# Patient Record
Sex: MALE | Race: WHITE | NOT HISPANIC OR LATINO | Employment: UNEMPLOYED | URBAN - METROPOLITAN AREA
[De-identification: names, ages, dates, MRNs, and addresses within clinical notes are randomized per-mention and may not be internally consistent; named-entity substitution may affect disease eponyms.]

---

## 2017-02-14 ENCOUNTER — TRANSCRIBE ORDERS (OUTPATIENT)
Dept: ADMINISTRATIVE | Age: 62
End: 2017-02-14

## 2017-02-14 ENCOUNTER — APPOINTMENT (OUTPATIENT)
Dept: LAB | Age: 62
End: 2017-02-14
Attending: PREVENTIVE MEDICINE

## 2017-02-14 DIAGNOSIS — Z00.00 ROUTINE GENERAL MEDICAL EXAMINATION AT A HEALTH CARE FACILITY: Primary | ICD-10-CM

## 2017-02-14 DIAGNOSIS — Z00.00 ROUTINE GENERAL MEDICAL EXAMINATION AT A HEALTH CARE FACILITY: ICD-10-CM

## 2017-02-14 LAB — RUBV IGG SERPL IA-ACNC: >175 IU/ML

## 2017-02-14 PROCEDURE — 86765 RUBEOLA ANTIBODY: CPT

## 2017-02-14 PROCEDURE — 86735 MUMPS ANTIBODY: CPT

## 2017-02-14 PROCEDURE — 86787 VARICELLA-ZOSTER ANTIBODY: CPT

## 2017-02-14 PROCEDURE — 36415 COLL VENOUS BLD VENIPUNCTURE: CPT

## 2017-02-14 PROCEDURE — 86762 RUBELLA ANTIBODY: CPT

## 2017-02-16 LAB
MEV IGG SER QL: NORMAL
MUV IGG SER QL: NORMAL
VZV IGG SER IA-ACNC: NORMAL

## 2018-02-05 ENCOUNTER — TELEPHONE (OUTPATIENT)
Dept: UROLOGY | Facility: AMBULATORY SURGERY CENTER | Age: 63
End: 2018-02-05

## 2018-02-16 RX ORDER — KETOCONAZOLE 20 MG/ML
SHAMPOO TOPICAL
Refills: 3 | COMMUNITY
Start: 2018-01-26

## 2018-02-20 DIAGNOSIS — N20.0 KIDNEY STONE: ICD-10-CM

## 2018-02-20 DIAGNOSIS — N40.0 BPH WITHOUT OBSTRUCTION/LOWER URINARY TRACT SYMPTOMS: Primary | ICD-10-CM

## 2018-02-21 ENCOUNTER — OFFICE VISIT (OUTPATIENT)
Dept: UROLOGY | Facility: MEDICAL CENTER | Age: 63
End: 2018-02-21
Payer: COMMERCIAL

## 2018-02-21 ENCOUNTER — TRANSCRIBE ORDERS (OUTPATIENT)
Dept: RADIOLOGY | Facility: HOSPITAL | Age: 63
End: 2018-02-21

## 2018-02-21 ENCOUNTER — HOSPITAL ENCOUNTER (OUTPATIENT)
Dept: RADIOLOGY | Facility: HOSPITAL | Age: 63
Discharge: HOME | End: 2018-02-21
Attending: UROLOGY
Payer: COMMERCIAL

## 2018-02-21 VITALS
BODY MASS INDEX: 24 KG/M2 | DIASTOLIC BLOOD PRESSURE: 70 MMHG | HEIGHT: 75 IN | SYSTOLIC BLOOD PRESSURE: 138 MMHG | WEIGHT: 193 LBS

## 2018-02-21 DIAGNOSIS — N20.0 CALCULUS OF KIDNEY: ICD-10-CM

## 2018-02-21 DIAGNOSIS — R35.0 FREQUENCY OF MICTURITION: Primary | ICD-10-CM

## 2018-02-21 DIAGNOSIS — N20.0 KIDNEY STONE: ICD-10-CM

## 2018-02-21 DIAGNOSIS — N40.1 BENIGN PROSTATIC HYPERPLASIA WITH URINARY OBSTRUCTION: ICD-10-CM

## 2018-02-21 DIAGNOSIS — N13.8 BENIGN PROSTATIC HYPERPLASIA WITH URINARY OBSTRUCTION: ICD-10-CM

## 2018-02-21 DIAGNOSIS — N20.0 KIDNEY STONE: Primary | ICD-10-CM

## 2018-02-21 LAB
SL AMB  POCT GLUCOSE, UA: NORMAL
SL AMB LEUKOCYTE ESTERASE,UA: NORMAL
SL AMB POCT BILIRUBIN,UA: NORMAL
SL AMB POCT BLOOD,UA: NORMAL
SL AMB POCT CLARITY,UA: CLEAR
SL AMB POCT COLOR,UA: YELLOW
SL AMB POCT KETONES,UA: NORMAL
SL AMB POCT NITRITE,UA: NORMAL
SL AMB POCT PH,UA: 7
SL AMB POCT SPECIFIC GRAVITY,UA: 1.01
SL AMB POCT URINE PROTEIN: NORMAL
SL AMB POCT UROBILINOGEN: 0.2

## 2018-02-21 PROCEDURE — 74018 RADEX ABDOMEN 1 VIEW: CPT

## 2018-02-21 PROCEDURE — 81003 URINALYSIS AUTO W/O SCOPE: CPT | Performed by: UROLOGY

## 2018-02-21 PROCEDURE — 99214 OFFICE O/P EST MOD 30 MIN: CPT | Performed by: UROLOGY

## 2018-02-21 RX ORDER — TAMSULOSIN HYDROCHLORIDE 0.4 MG/1
0.4 CAPSULE ORAL
COMMUNITY
Start: 2018-01-29 | End: 2019-01-29

## 2018-02-21 RX ORDER — BUTALBITAL, ACETAMINOPHEN AND CAFFEINE 50; 325; 40 MG/1; MG/1; MG/1
TABLET ORAL
COMMUNITY
Start: 2018-01-29

## 2018-02-21 RX ORDER — ZINC GLUCONATE 50 MG
50 TABLET ORAL
COMMUNITY

## 2018-02-21 RX ORDER — LORAZEPAM 0.5 MG/1
TABLET ORAL
COMMUNITY
Start: 2017-10-23

## 2018-02-21 RX ORDER — ASCORBATE CALCIUM 500 MG
500 TABLET ORAL
COMMUNITY

## 2018-02-21 RX ORDER — BILBERRY FRUIT 1000 MG
CAPSULE ORAL
COMMUNITY

## 2018-02-21 RX ORDER — DIPHENOXYLATE HYDROCHLORIDE AND ATROPINE SULFATE 2.5; .025 MG/1; MG/1
1 TABLET ORAL
COMMUNITY
Start: 2013-12-04

## 2018-02-21 RX ORDER — FERROUS SULFATE 325(65) MG
325 TABLET ORAL
COMMUNITY
Start: 2017-08-02 | End: 2018-08-02

## 2018-02-21 NOTE — PROGRESS NOTES
Assessment/Plan:    Frequency of micturition  Resolved    Benign prostatic hyperplasia with urinary obstruction  Patient is generally satisfied with his voiding pattern  His AUA symptom score is 17  PSA in July 2017 was 2 2  We discussed treatment options  He will begin saw palmetto  He has a prescription for TAMSULOSIN and he will try this medication if he is not happy with the results of Sol palmetto  Side effects of TAMSULOSIN were discussed  Calculus of kidney  KUB was reviewed today  Multiple tiny stones in left kidney are seen  He is asymptomatic  He will continue on observation and will return in 1 year  Diagnoses and all orders for this visit:    Frequency of micturition  -     POCT urine dip auto non-scope    Benign prostatic hyperplasia with urinary obstruction    Calculus of kidney    Other orders  -     Calcium Ascorbate 500 MG TABS; Take 500 mg by mouth  -     aspirin 81 MG tablet; Take 81 mg by mouth  -     butalbital-acetaminophen-caffeine (FIORICET,ESGIC) -40 mg per tablet; 1-2 tablets up to tid prn headaches  -     cholecalciferol (VITAMIN D3) 20173 units capsule; Take 1,000 Units by mouth  -     ferrous sulfate 325 (65 Fe) mg tablet; Take 325 mg by mouth  -     LORazepam (ATIVAN) 0 5 mg tablet; TAKE 1 TABLET BY MOUTH TWICE A DAY AS NEEDED  -     multivitamin (THERAGRAN) TABS; Take 1 tablet by mouth  -     tamsulosin (FLOMAX) 0 4 mg; Take 0 4 mg by mouth  -     zinc gluconate 50 mg tablet; Take 50 mg by mouth  -     Saw [place] 1000 MG CAPS; Take by mouth          Subjective:      Patient ID: Juan Pablo English is a 57 y o  male  15-year-old male followed for the lower tract symptoms secondary to BPH and a history of kidney stones  He has not passed any stones recently  He feels well  He generally voids adequately although he has had episodes of frequency and nocturia  He never started the TAMSULOSIN prescribed last year  He is interested in trying saw palmetto    He denies gross hematuria, dysuria or symptoms of infection  He has no flank pain  The following portions of the patient's history were reviewed and updated as appropriate: allergies, current medications, past family history, past medical history, past social history, past surgical history and problem list     Review of Systems   Constitutional: Negative for chills, diaphoresis, fatigue and fever  HENT: Negative  Eyes: Negative  Respiratory: Negative  Cardiovascular: Negative  Endocrine: Negative  Musculoskeletal: Negative  Skin: Negative  Allergic/Immunologic: Negative  Neurological: Negative  Hematological: Negative  Psychiatric/Behavioral: Negative  Objective:      /70 (BP Location: Left arm, Patient Position: Sitting)   Ht 6' 2 5" (1 892 m)   Wt 87 5 kg (193 lb)   BMI 24 45 kg/m²          Physical Exam   Constitutional: He is oriented to person, place, and time  He appears well-developed and well-nourished  HENT:   Head: Normocephalic and atraumatic  Eyes: Conjunctivae are normal    Neck: Neck supple  Cardiovascular: Normal rate  Pulmonary/Chest: Effort normal    Abdominal: Soft  Bowel sounds are normal  He exhibits no distension and no mass  There is no tenderness  There is no rebound, no guarding and no CVA tenderness  Hernia confirmed negative in the right inguinal area and confirmed negative in the left inguinal area  No CVA tenderness     Left inguinal hernia   Genitourinary: Rectum normal, testes normal and penis normal  Prostate is enlarged  Prostate is not tender  Right testis shows no mass  Left testis shows no mass  No phimosis or hypospadias  Genitourinary Comments:  Prostate moderately enlarged, nontender and palpably benign   Musculoskeletal: He exhibits no edema  Neurological: He is alert and oriented to person, place, and time  Skin: Skin is warm and dry  Psychiatric: He has a normal mood and affect   His behavior is normal  Judgment and thought content normal

## 2018-02-21 NOTE — LETTER
February 21, 2018     [phone] [phone]    Patient: Jose Posada   YOB: 1955   Date of Visit: 2/21/2018       Dear Dr Noreen Strickland: Thank you for referring Jose Posada to me for evaluation  Below are my notes for this consultation  If you have questions, please do not hesitate to call me  I look forward to following your patient along with you  Sincerely,        Ren Acuna MD        CC: No Recipients  Ren Acuna MD  2/21/2018  4:34 PM  Signed  Assessment/Plan:    Frequency of micturition  Resolved    Benign prostatic hyperplasia with urinary obstruction  Patient is generally satisfied with his voiding pattern  His AUA symptom score is 17  PSA in July 2017 was 2 2  We discussed treatment options  He will begin saw palmetto  He has a prescription for TAMSULOSIN and he will try this medication if he is not happy with the results of Sol palmetto  Side effects of TAMSULOSIN were discussed  Calculus of kidney  KUB was reviewed today  Multiple tiny stones in left kidney are seen  He is asymptomatic  He will continue on observation and will return in 1 year  Diagnoses and all orders for this visit:    Frequency of micturition  -     POCT urine dip auto non-scope    Benign prostatic hyperplasia with urinary obstruction    Calculus of kidney    Other orders  -     Calcium Ascorbate 500 MG TABS; Take 500 mg by mouth  -     aspirin 81 MG tablet; Take 81 mg by mouth  -     butalbital-acetaminophen-caffeine (FIORICET,ESGIC) -40 mg per tablet; 1-2 tablets up to tid prn headaches  -     cholecalciferol (VITAMIN D3) 21333 units capsule; Take 1,000 Units by mouth  -     ferrous sulfate 325 (65 Fe) mg tablet;  Take 325 mg by mouth  -     LORazepam (ATIVAN) 0 5 mg tablet; TAKE 1 TABLET BY MOUTH TWICE A DAY AS NEEDED  -     multivitamin (THERAGRAN) TABS; Take 1 tablet by mouth  -     tamsulosin (FLOMAX) 0 4 mg; Take 0 4 mg by mouth  -     zinc gluconate 50 mg tablet; Take 50 mg by mouth  -     Saw [place] 1000 MG CAPS; Take by mouth          Subjective:      Patient ID: Nomi Mason is a 57 y o  male  42-year-old male followed for the lower tract symptoms secondary to BPH and a history of kidney stones  He has not passed any stones recently  He feels well  He generally voids adequately although he has had episodes of frequency and nocturia  He never started the TAMSULOSIN prescribed last year  He is interested in trying saw palmetto  He denies gross hematuria, dysuria or symptoms of infection  He has no flank pain  The following portions of the patient's history were reviewed and updated as appropriate: allergies, current medications, past family history, past medical history, past social history, past surgical history and problem list     Review of Systems   Constitutional: Negative for chills, diaphoresis, fatigue and fever  HENT: Negative  Eyes: Negative  Respiratory: Negative  Cardiovascular: Negative  Endocrine: Negative  Musculoskeletal: Negative  Skin: Negative  Allergic/Immunologic: Negative  Neurological: Negative  Hematological: Negative  Psychiatric/Behavioral: Negative  Objective:      /70 (BP Location: Left arm, Patient Position: Sitting)   Ht 6' 2 5" (1 892 m)   Wt 87 5 kg (193 lb)   BMI 24 45 kg/m²           Physical Exam   Constitutional: He is oriented to person, place, and time  He appears well-developed and well-nourished  HENT:   Head: Normocephalic and atraumatic  Eyes: Conjunctivae are normal    Neck: Neck supple  Cardiovascular: Normal rate  Pulmonary/Chest: Effort normal    Abdominal: Soft  Bowel sounds are normal  He exhibits no distension and no mass  There is no tenderness  There is no rebound, no guarding and no CVA tenderness   Hernia confirmed negative in the right inguinal area and confirmed negative in the left inguinal area  No CVA tenderness     Left inguinal hernia   Genitourinary: Rectum normal, testes normal and penis normal  Prostate is enlarged  Prostate is not tender  Right testis shows no mass  Left testis shows no mass  No phimosis or hypospadias  Genitourinary Comments:  Prostate moderately enlarged, nontender and palpably benign   Musculoskeletal: He exhibits no edema  Neurological: He is alert and oriented to person, place, and time  Skin: Skin is warm and dry  Psychiatric: He has a normal mood and affect   His behavior is normal  Judgment and thought content normal

## 2018-02-21 NOTE — PROGRESS NOTES
IPSS Questionnaire (AUA-7): Over the past month    1)  How often have you had a sensation of not emptying your bladder completely after you finish urinating? 3 - About half the time   2)  How often have you had to urinate again less than two hours after you finished urinating? 3 - About half the time   3)  How often have you found you stopped and started again several times when you urinated? 3 - About half the time   4) How difficult have you found it to postpone urination? 3 - About half the time   5) How often have you had a weak urinary stream?  3 - About half the time   6) How often have you had to push or strain to begin urination? 1 - Less than 1 time in 5   7) How many times did you most typically get up to urinate from the time you went to bed until the time you got up in the morning?   1 - 1 time   Total Score:  17

## 2018-02-21 NOTE — PATIENT INSTRUCTIONS
Benign Prostatic Hypertrophy   WHAT YOU NEED TO KNOW:   Benign prostatic hypertrophy (BPH) is a condition that causes your prostate gland to grow larger than normal  The prostate gland is the male sex gland that produces a fluid that is part of semen  It is about the size of a walnut and it is located under the bladder  As the prostate grows, it can squeeze the urethra  This can block urine flow and cause urinary problems  DISCHARGE INSTRUCTIONS:   Medicines:   · Alpha blockers: This medicine relaxes the muscles in your prostate and bladder  It may help you urinate more easily  · 5 alpha reductase inhibitors: These medicines block the production of a hormone that causes the prostate to get larger  It may help slow the growth of the prostate or shrink the prostate  · Take your medicine as directed  Contact your healthcare provider if you think your medicine is not helping or if you have side effects  Tell him or her if you are allergic to any medicine  Keep a list of the medicines, vitamins, and herbs you take  Include the amounts, and when and why you take them  Bring the list or the pill bottles to follow-up visits  Carry your medicine list with you in case of an emergency  Follow up with your healthcare provider as directed:  Write down your questions so you remember to ask them during your visits  Manage BPH:   · Do not let your bladder get too full before you empty it  Urinate when you feel the urge  · Limit alcohol  Do not drink large amounts of any liquid at one time  · Decrease the amount of salt you eat  Examples of salty foods are chips, cured meats, and canned soups  Do not use table salt  · Healthcare providers may tell you not to eat spicy foods such as chilli peppers  This may help you find out if spicy food makes your BPH symptoms worse  · You may have sex if you feel well  Contact your healthcare provider if:   · There is a large amount of blood in your urine  · Your signs and symptoms get worse  · You have a fever  · You have questions or concerns about your condition or care  Seek care immediately if:   · You are unable to urinate  · Your bladder feels very full and painful  © 2017 [address] [employer] Information is for End User's use only and may not be sold, redistributed or otherwise used for commercial purposes  All illustrations and images included in CareNotes® are the copyrighted property of [employer]  or [address]  The above information is an  only  It is not intended as medical advice for individual conditions or treatments  Talk to your doctor, nurse or pharmacist before following any medical regimen to see if it is safe and effective for you

## 2018-02-21 NOTE — ASSESSMENT & PLAN NOTE
KUB was reviewed today  Multiple tiny stones in left kidney are seen  He is asymptomatic  He will continue on observation and will return in 1 year

## 2018-02-21 NOTE — ASSESSMENT & PLAN NOTE
Patient is generally satisfied with his voiding pattern  His AUA symptom score is 17  PSA in July 2017 was 2 2  We discussed treatment options  He will begin saw palmetto  He has a prescription for TAMSULOSIN and he will try this medication if he is not happy with the results of Sol palmetto  Side effects of TAMSULOSIN were discussed

## 2019-03-05 ENCOUNTER — TELEPHONE (OUTPATIENT)
Dept: UROLOGY | Facility: MEDICAL CENTER | Age: 64
End: 2019-03-05

## 2019-03-21 RX ORDER — ROSUVASTATIN CALCIUM 5 MG/1
5 TABLET, COATED ORAL DAILY
Refills: 6 | COMMUNITY
Start: 2019-01-09

## 2020-06-29 ENCOUNTER — TRANSCRIBE ORDERS (OUTPATIENT)
Dept: ADMINISTRATIVE | Facility: HOSPITAL | Age: 65
End: 2020-06-29

## 2020-06-29 DIAGNOSIS — R22.33 LOCALIZED SWELLING, MASS AND LUMP, UPPER LIMB, BILATERAL: Primary | ICD-10-CM

## 2020-07-01 ENCOUNTER — HOSPITAL ENCOUNTER (OUTPATIENT)
Dept: RADIOLOGY | Facility: HOSPITAL | Age: 65
Discharge: HOME | End: 2020-07-01
Payer: MEDICARE

## 2020-07-01 DIAGNOSIS — R22.33 LOCALIZED SWELLING, MASS AND LUMP, UPPER LIMB, BILATERAL: ICD-10-CM

## 2020-07-01 PROCEDURE — 76882 US LMTD JT/FCL EVL NVASC XTR: CPT

## 2020-12-23 ENCOUNTER — TRANSCRIBE ORDERS (OUTPATIENT)
Dept: ADMINISTRATIVE | Facility: HOSPITAL | Age: 65
End: 2020-12-23

## 2020-12-23 DIAGNOSIS — N64.4 BREAST TENDERNESS IN MALE: Primary | ICD-10-CM

## 2021-12-01 PROBLEM — G47.33 OBSTRUCTIVE SLEEP APNEA: Status: ACTIVE | Noted: 2021-12-01

## 2022-03-15 ENCOUNTER — TELEPHONE (OUTPATIENT)
Dept: PLASTIC SURGERY | Facility: CLINIC | Age: 67
End: 2022-03-15

## 2024-10-04 DIAGNOSIS — Z00.6 ENCOUNTER FOR EXAMINATION FOR NORMAL COMPARISON OR CONTROL IN CLINICAL RESEARCH PROGRAM: ICD-10-CM

## 2024-10-29 ENCOUNTER — APPOINTMENT (OUTPATIENT)
Dept: LAB | Facility: CLINIC | Age: 69
End: 2024-10-29

## 2024-10-29 DIAGNOSIS — Z00.6 ENCOUNTER FOR EXAMINATION FOR NORMAL COMPARISON OR CONTROL IN CLINICAL RESEARCH PROGRAM: ICD-10-CM

## 2024-10-29 PROCEDURE — 36415 COLL VENOUS BLD VENIPUNCTURE: CPT

## 2024-11-05 LAB
APOB+LDLR+PCSK9 GENE MUT ANL BLD/T: NOT DETECTED
BRCA1+BRCA2 DEL+DUP + FULL MUT ANL BLD/T: NOT DETECTED
MLH1+MSH2+MSH6+PMS2 GN DEL+DUP+FUL M: NOT DETECTED

## 2024-12-26 ENCOUNTER — TELEPHONE (OUTPATIENT)
Age: 69
End: 2024-12-26

## 2024-12-26 NOTE — TELEPHONE ENCOUNTER
Juan called and asked if Dr Guzman had any recommendations for a [place] speaking  in the [facility]. He stated he has a patient visiting from the [place] that he would like to refer to an . He asked that Dr Guzman or their assistant return his call to discuss. Please advise.